# Patient Record
Sex: MALE | Race: BLACK OR AFRICAN AMERICAN | Employment: OTHER | URBAN - METROPOLITAN AREA
[De-identification: names, ages, dates, MRNs, and addresses within clinical notes are randomized per-mention and may not be internally consistent; named-entity substitution may affect disease eponyms.]

---

## 2022-03-18 PROBLEM — Z85.46 HISTORY OF PROSTATE CANCER: Status: ACTIVE | Noted: 2018-07-27

## 2022-03-18 PROBLEM — R35.0 URINARY FREQUENCY: Status: ACTIVE | Noted: 2018-08-03

## 2022-03-19 PROBLEM — N52.9 IMPOTENCE OF ORGANIC ORIGIN: Status: ACTIVE | Noted: 2018-01-30

## 2022-03-19 PROBLEM — M15.9 GENERALIZED OSTEOARTHRITIS OF MULTIPLE SITES: Status: ACTIVE | Noted: 2019-12-17

## 2022-03-19 PROBLEM — I27.82 CHRONIC PULMONARY EMBOLISM WITHOUT ACUTE COR PULMONALE (HCC): Status: ACTIVE | Noted: 2021-04-15

## 2022-03-19 PROBLEM — Z77.090 ASBESTOS EXPOSURE: Status: ACTIVE | Noted: 2019-07-30

## 2022-03-19 PROBLEM — R55 SYNCOPE: Status: ACTIVE | Noted: 2020-03-03

## 2022-03-19 PROBLEM — C61 PROSTATE CA (HCC): Status: ACTIVE | Noted: 2018-11-16

## 2022-03-19 PROBLEM — M05.79 RHEUMATOID ARTHRITIS INVOLVING MULTIPLE SITES WITH POSITIVE RHEUMATOID FACTOR (HCC): Status: ACTIVE | Noted: 2018-08-03

## 2022-03-19 PROBLEM — E11.51 TYPE 2 DIABETES, CONTROLLED, WITH PERIPHERAL CIRCULATORY DISORDER (HCC): Status: ACTIVE | Noted: 2018-11-16

## 2022-03-19 PROBLEM — Z90.79 HX OF PROSTATECTOMY: Status: ACTIVE | Noted: 2018-08-03

## 2022-03-19 PROBLEM — R39.9 LOWER URINARY TRACT SYMPTOMS (LUTS): Status: ACTIVE | Noted: 2018-01-30

## 2022-03-19 PROBLEM — R05.9 COUGH: Status: ACTIVE | Noted: 2021-07-26

## 2022-03-20 PROBLEM — D84.9 IMMUNOSUPPRESSION (HCC): Status: ACTIVE | Noted: 2021-05-24

## 2023-03-28 ENCOUNTER — OFFICE VISIT (OUTPATIENT)
Age: 78
End: 2023-03-28

## 2023-03-28 VITALS
HEART RATE: 81 BPM | RESPIRATION RATE: 18 BRPM | OXYGEN SATURATION: 99 % | BODY MASS INDEX: 25.16 KG/M2 | WEIGHT: 166 LBS | HEIGHT: 68 IN

## 2023-03-28 DIAGNOSIS — N18.30 STAGE 3 CHRONIC KIDNEY DISEASE, UNSPECIFIED WHETHER STAGE 3A OR 3B CKD (HCC): ICD-10-CM

## 2023-03-28 DIAGNOSIS — M05.79 RHEUMATOID ARTHRITIS INVOLVING MULTIPLE SITES WITH POSITIVE RHEUMATOID FACTOR (HCC): ICD-10-CM

## 2023-03-28 DIAGNOSIS — E11.51 TYPE 2 DIABETES, CONTROLLED, WITH PERIPHERAL CIRCULATORY DISORDER (HCC): ICD-10-CM

## 2023-03-28 DIAGNOSIS — M17.11 PRIMARY OSTEOARTHRITIS OF RIGHT KNEE: ICD-10-CM

## 2023-03-28 DIAGNOSIS — M17.12 PRIMARY OSTEOARTHRITIS OF LEFT KNEE: Primary | ICD-10-CM

## 2023-03-28 RX ORDER — TRIAMCINOLONE ACETONIDE 40 MG/ML
80 INJECTION, SUSPENSION INTRA-ARTICULAR; INTRAMUSCULAR ONCE
Status: COMPLETED | OUTPATIENT
Start: 2023-03-28 | End: 2023-03-28

## 2023-03-28 RX ADMIN — TRIAMCINOLONE ACETONIDE 80 MG: 40 INJECTION, SUSPENSION INTRA-ARTICULAR; INTRAMUSCULAR at 15:12

## 2023-03-28 NOTE — ASSESSMENT & PLAN NOTE
25-year-old male with severe bilateral osteoarthritis secondary to gout and rheumatoid arthritis. At this time he is tolerating his right knee arthritis fairly well but he has a large effusion of the left knee and significant pain there. He also has significant medical comorbidities including chronic kidney disease, history of pulmonary embolism, diabetes mellitus and cor pulmonale. I offered an aspiration injection of the left knee today and like to go forward with that. We touched upon total knee arthroplasty but the patient has not seen his primary care physician in some time so he must address his overall health prior to consideration for knee replacement. After obtaining written consent for left knee intra-articular injection and aspiration the patient was prepped in normal sterile fashion with freeze spray and alcohol.  50mL of cloudy synovial fluid was aspirated. 80mg kenalog and 2mL 2% lidocaine was injected . The needle was withdrawn, the area was cleansed and a sterile bandage was applied. The patient tolerated the procedure well. The fluid will not be sent as it looks consistent with gout.

## 2023-03-29 ENCOUNTER — OFFICE VISIT (OUTPATIENT)
Age: 78
End: 2023-03-29

## 2023-03-29 VITALS — TEMPERATURE: 97.1 F | HEIGHT: 68 IN | WEIGHT: 167 LBS | BODY MASS INDEX: 25.31 KG/M2

## 2023-03-29 DIAGNOSIS — M06.9 RHEUMATOID ARTHRITIS INVOLVING LEFT WRIST, UNSPECIFIED WHETHER RHEUMATOID FACTOR PRESENT (HCC): Primary | ICD-10-CM

## 2023-03-29 NOTE — PROGRESS NOTES
Garold Fleischer is a 69 y.o. male right handed retiree. Worker's Compensation and legal considerations: none    Chief Complaint   Patient presents with    Wrist Pain     Left wrist pain     Pain Score:   5    HPI: Patient presents today with complaints of left wrist pain. He has a known history of rheumatoid arthritis. He reports the wrist pain has recently improved but is occasionally painful. He also has a history of gout. Date of onset: Chronic  Injury: No  Prior Treatment:  Yes: Comment: Currently on methotrexate and has been on colchicine in the past    ROS: Review of Systems - General ROS: negative except HPI    Past Medical History:   Diagnosis Date    Diabetes mellitus ([facility] [address]     Enlarged thyroid     GERD (gastroesophageal reflux disease)     Gout     Hyperlipidemia     Kidney disease     Prostate cancer ([facility] [address]     RA (rheumatoid arthritis) ([facility] [address]     Thyroid nodule        Past Surgical History:   Procedure Laterality Date    CARDIAC CATHETERIZATION      COLONOSCOPY      EGD DELIVER THERMAL ENERGY SPHNCTR/CARDIA GERD      PROSTATECTOMY          Current Outpatient Medications   Medication Sig Dispense Refill    Alprostadil, Vasodilator, 40 MCG SOLR Please dispense compounded Alprostadil 40 mcg per ml. Patient to inject 0.5 ml as directed. May titrate to 0.6 or 0.7 if needed.       colchicine (COLCRYS) 0.6 MG tablet ceived the following from [facility]: Outside name: COLCRYS 0.6 mg tablet      febuxostat (ULORIC) 40 MG TABS tablet ceived the following from [facility]: Outside name: ULORIC 40 mg tab tablet      ferrous sulfate (IRON 325) 325 (65 Fe) MG tablet Take 325 mg by mouth daily      folic acid (FOLVITE) 1 MG tablet Take 1 mg by mouth daily      hydrOXYzine HCl (ATARAX) 10 MG tablet TAKE ONE TABLET BY MOUTH TWICE A DAY AS NEEDED FOR ANXIETY      linaCLOtide (LINZESS) 72 MCG CAPS capsule TAKE 1 CAPSULE ORALLY DAILY 30 MINUTES BEFORE BREAKFAST      methotrexate

## 2023-05-10 ENCOUNTER — OFFICE VISIT (OUTPATIENT)
Age: 78
End: 2023-05-10

## 2023-05-10 VITALS — WEIGHT: 168 LBS | HEIGHT: 68 IN | BODY MASS INDEX: 25.46 KG/M2

## 2023-05-10 DIAGNOSIS — M06.9 RHEUMATOID ARTHRITIS INVOLVING LEFT WRIST, UNSPECIFIED WHETHER RHEUMATOID FACTOR PRESENT (HCC): Primary | ICD-10-CM

## 2023-05-10 DIAGNOSIS — M25.571 RIGHT ANKLE PAIN, UNSPECIFIED CHRONICITY: ICD-10-CM

## 2023-05-10 DIAGNOSIS — M54.2 NECK PAIN: ICD-10-CM

## 2023-05-10 NOTE — PROGRESS NOTES
Juve Knowles is a 69 y.o. male right handed retiree. Worker's Compensation and legal considerations: none    Chief Complaint   Patient presents with    Wrist Pain     Left wrist pain     Pain Score:   8    HPI: Patient presents today for follow-up of left wrist pain. He is requesting an injection today. He also presents with new problems of neck pain as well as right ankle pain. He has not seen anyone for these issues. He reports them to be within the past several months. He reports having a rheumatologist.    Initial HPI: Patient presents today with complaints of left wrist pain. He has a known history of rheumatoid arthritis. He reports the wrist pain has recently improved but is occasionally painful. He also has a history of gout. Date of onset: Chronic  Injury: No  Prior Treatment:  Yes: Comment: Currently on methotrexate and has been on colchicine in the past    ROS: Review of Systems - General ROS: negative except HPI    Past Medical History:   Diagnosis Date    Diabetes mellitus ([facility] [address]     Enlarged thyroid     GERD (gastroesophageal reflux disease)     Gout     Hyperlipidemia     Kidney disease     Prostate cancer ([facility] [address]     RA (rheumatoid arthritis) ([facility] [address]     Thyroid nodule        Past Surgical History:   Procedure Laterality Date    CARDIAC CATHETERIZATION      COLONOSCOPY      EGD DELIVER THERMAL ENERGY SPHNCTR/CARDIA GERD      PROSTATECTOMY          Current Outpatient Medications   Medication Sig Dispense Refill    Alprostadil, Vasodilator, 40 MCG SOLR Please dispense compounded Alprostadil 40 mcg per ml. Patient to inject 0.5 ml as directed. May titrate to 0.6 or 0.7 if needed.       colchicine (COLCRYS) 0.6 MG tablet ceived the following from [facility]: Outside name: COLCRYS 0.6 mg tablet      febuxostat (ULORIC) 40 MG TABS tablet ceived the following from [facility]: Outside name: ULORIC 40 mg tab tablet      ferrous sulfate (IRON 325) 325 (65 Fe) MG tablet

## 2023-12-12 ENCOUNTER — OFFICE VISIT (OUTPATIENT)
Age: 78
End: 2023-12-12
Payer: MEDICARE

## 2023-12-12 VITALS
TEMPERATURE: 97.3 F | HEART RATE: 76 BPM | RESPIRATION RATE: 18 BRPM | WEIGHT: 175.2 LBS | HEIGHT: 68 IN | OXYGEN SATURATION: 97 % | BODY MASS INDEX: 26.55 KG/M2

## 2023-12-12 DIAGNOSIS — M79.18 CERVICAL MYOFASCIAL PAIN SYNDROME: ICD-10-CM

## 2023-12-12 DIAGNOSIS — M54.2 CERVICAL PAIN: ICD-10-CM

## 2023-12-12 DIAGNOSIS — M25.511 BILATERAL SHOULDER PAIN, UNSPECIFIED CHRONICITY: ICD-10-CM

## 2023-12-12 DIAGNOSIS — M25.50 MULTIPLE JOINT PAIN: ICD-10-CM

## 2023-12-12 DIAGNOSIS — M54.9 UPPER BACK PAIN: ICD-10-CM

## 2023-12-12 DIAGNOSIS — M47.812 FACET ARTHROPATHY, CERVICAL: Primary | ICD-10-CM

## 2023-12-12 DIAGNOSIS — M67.912 DISORDER OF ROTATOR CUFF OF BOTH SHOULDERS: ICD-10-CM

## 2023-12-12 DIAGNOSIS — M25.512 BILATERAL SHOULDER PAIN, UNSPECIFIED CHRONICITY: ICD-10-CM

## 2023-12-12 DIAGNOSIS — M67.911 DISORDER OF ROTATOR CUFF OF BOTH SHOULDERS: ICD-10-CM

## 2023-12-12 PROBLEM — R91.1 SOLITARY PULMONARY NODULE: Status: ACTIVE | Noted: 2023-12-12

## 2023-12-12 PROBLEM — J41.1 MUCOPURULENT CHRONIC BRONCHITIS (HCC): Status: ACTIVE | Noted: 2023-07-20

## 2023-12-12 PROBLEM — D53.1 MEGALOBLASTIC ANEMIA DUE TO VITAMIN B12 DEFICIENCY: Status: ACTIVE | Noted: 2023-12-12

## 2023-12-12 PROBLEM — M06.9 RHEUMATOID ARTHRITIS (HCC): Status: ACTIVE | Noted: 2023-12-12

## 2023-12-12 PROBLEM — J61 ASBESTOSIS (HCC): Status: ACTIVE | Noted: 2023-12-12

## 2023-12-12 PROBLEM — N18.31 STAGE 3A CHRONIC KIDNEY DISEASE (HCC): Status: ACTIVE | Noted: 2022-06-21

## 2023-12-12 PROBLEM — H15.129 NODULAR EPISCLERITIS: Status: ACTIVE | Noted: 2023-12-12

## 2023-12-12 PROBLEM — Q61.9 CONGENITAL CYSTIC KIDNEY DISEASE: Status: ACTIVE | Noted: 2023-12-12

## 2023-12-12 PROBLEM — E04.1 THYROID NODULE: Status: ACTIVE | Noted: 2023-12-12

## 2023-12-12 PROBLEM — G25.0 ESSENTIAL TREMOR: Status: ACTIVE | Noted: 2023-12-12

## 2023-12-12 PROBLEM — C61 MALIGNANT TUMOR OF PROSTATE (HCC): Status: ACTIVE | Noted: 2023-12-12

## 2023-12-12 PROBLEM — N50.82 SCROTAL PAIN: Status: ACTIVE | Noted: 2023-12-12

## 2023-12-12 PROBLEM — I48.91 UNSPECIFIED ATRIAL FIBRILLATION (HCC): Status: ACTIVE | Noted: 2023-12-12

## 2023-12-12 PROBLEM — M17.12 UNILATERAL PRIMARY OSTEOARTHRITIS, LEFT KNEE: Status: ACTIVE | Noted: 2023-12-12

## 2023-12-12 PROBLEM — C61 CARCINOMA OF PROSTATE (HCC): Status: ACTIVE | Noted: 2023-12-12

## 2023-12-12 PROBLEM — E11.9 DIABETES MELLITUS WITHOUT COMPLICATION (HCC): Status: ACTIVE | Noted: 2023-12-12

## 2023-12-12 PROBLEM — G47.01 INSOMNIA DUE TO MEDICAL CONDITION: Status: ACTIVE | Noted: 2023-12-12

## 2023-12-12 PROBLEM — M06.9 RHEUMATOID ARTHRITIS, UNSPECIFIED (HCC): Status: ACTIVE | Noted: 2023-12-12

## 2023-12-12 PROBLEM — D50.9 IRON DEFICIENCY ANEMIA: Status: ACTIVE | Noted: 2023-12-12

## 2023-12-12 PROBLEM — N45.3 EPIDIDYMO-ORCHITIS WITHOUT ABSCESS: Status: ACTIVE | Noted: 2022-06-29

## 2023-12-12 PROBLEM — R01.1 CARDIAC MURMUR, UNSPECIFIED: Status: ACTIVE | Noted: 2023-12-12

## 2023-12-12 PROBLEM — Z77.29 CONTACT WITH AND (SUSPECTED) EXPOSURE TO OTHER HAZARDOUS SUBSTANCES: Status: ACTIVE | Noted: 2023-12-12

## 2023-12-12 PROBLEM — I10 HYPERTENSION: Status: ACTIVE | Noted: 2023-12-12

## 2023-12-12 PROBLEM — E11.9 DIABETES MELLITUS, TYPE 2 (HCC): Status: ACTIVE | Noted: 2023-12-12

## 2023-12-12 PROBLEM — I47.10 SUPRAVENTRICULAR TACHYCARDIA: Status: ACTIVE | Noted: 2023-06-23

## 2023-12-12 PROBLEM — R00.2 PALPITATIONS: Status: ACTIVE | Noted: 2023-12-12

## 2023-12-12 PROBLEM — F43.12 POST-TRAUMATIC STRESS DISORDER, CHRONIC: Status: ACTIVE | Noted: 2023-12-12

## 2023-12-12 PROBLEM — I48.91 ATRIAL FIBRILLATION (HCC): Status: ACTIVE | Noted: 2023-12-12

## 2023-12-12 PROBLEM — L82.1 OTHER SEBORRHEIC KERATOSIS: Status: ACTIVE | Noted: 2023-12-12

## 2023-12-12 PROBLEM — F43.12 CHRONIC POST-TRAUMATIC STRESS DISORDER: Status: ACTIVE | Noted: 2023-12-12

## 2023-12-12 PROBLEM — M10.9 GOUT: Status: ACTIVE | Noted: 2023-12-12

## 2023-12-12 PROBLEM — N28.1 ACQUIRED COMPLEX RENAL CYST: Status: ACTIVE | Noted: 2023-12-12

## 2023-12-12 PROBLEM — F33.9 DEPRESSION, RECURRENT (HCC): Status: ACTIVE | Noted: 2022-02-21

## 2023-12-12 PROBLEM — K31.3 PYLORIC SPASM: Status: ACTIVE | Noted: 2017-09-01

## 2023-12-12 PROBLEM — D72.819 LEUKOPENIA: Status: ACTIVE | Noted: 2023-12-12

## 2023-12-12 PROBLEM — M12.9 ARTHROPATHY: Status: ACTIVE | Noted: 2023-12-12

## 2023-12-12 PROBLEM — J43.2 CENTRILOBULAR EMPHYSEMA (HCC): Status: ACTIVE | Noted: 2022-02-21

## 2023-12-12 PROCEDURE — G8417 CALC BMI ABV UP PARAM F/U: HCPCS | Performed by: PHYSICAL MEDICINE & REHABILITATION

## 2023-12-12 PROCEDURE — 1123F ACP DISCUSS/DSCN MKR DOCD: CPT | Performed by: PHYSICAL MEDICINE & REHABILITATION

## 2023-12-12 PROCEDURE — G8427 DOCREV CUR MEDS BY ELIG CLIN: HCPCS | Performed by: PHYSICAL MEDICINE & REHABILITATION

## 2023-12-12 PROCEDURE — 99204 OFFICE O/P NEW MOD 45 MIN: CPT | Performed by: PHYSICAL MEDICINE & REHABILITATION

## 2023-12-12 PROCEDURE — 1036F TOBACCO NON-USER: CPT | Performed by: PHYSICAL MEDICINE & REHABILITATION

## 2023-12-12 PROCEDURE — G8484 FLU IMMUNIZE NO ADMIN: HCPCS | Performed by: PHYSICAL MEDICINE & REHABILITATION

## 2023-12-12 ASSESSMENT — ENCOUNTER SYMPTOMS
BACK PAIN: 1
NAUSEA: 0
DIARRHEA: 0
TROUBLE SWALLOWING: 0
SHORTNESS OF BREATH: 0
VOMITING: 0

## 2023-12-12 NOTE — PROGRESS NOTES
release capsule as needed      pantoprazole (PROTONIX) 20 MG tablet ceived the following from [facility]: Outside name: pantoprazole (PROTONIX) 20 mg tablet      predniSONE (DELTASONE) 5 [facility] the [facility]: predniSONE (DELTASONE) 5 mg tablet      traZODone (DESYREL) 50 MG tablet ceived the following from  [facility]: Outside name: traZODone (DESYREL) 50 mg tablet      colchicine (COLCRYS) 0.6 MG tablet ceived the following from [facility]: Outside name: COLCRYS 0.6 mg tablet      febuxostat (ULORIC) 40 MG TABS tablet ceived the following from [facility]: Outside name: ULORIC 40 mg tab tablet      hydrOXYzine HCl (ATARAX) 10 MG tablet TAKE ONE TABLET BY MOUTH TWICE A DAY AS NEEDED FOR ANXIETY (Patient not taking: Reported on 2023)      linaCLOtide (LINZESS) 72 MCG CAPS capsule TAKE 1 CAPSULE ORALLY DAILY 30 MINUTES BEFORE BREAKFAST (Patient not taking: Reported on 2023)      mirtazapine (REMERON) 30 MG tablet TAKE ONE TABLET BY MOUTH HS      oxybutynin (DITROPAN-XL) 10 MG extended release tablet Take 1 tablet by mouth daily       No current facility-administered medications for this visit.        ALLERGIES    Allergies   Allergen Reactions    Doxycycline Hives          SOCIAL HISTORY    Social History     Socioeconomic History    Marital status: Single     Spouse name: None    Number of children: None    Years of education: None    Highest education level: None   Tobacco Use    Smoking status: Former     Packs/day: 1     Types: Cigarettes     Quit date: 1990     Years since quittin.9    Smokeless tobacco: Never   Vaping Use    Vaping Use: Never used   Substance and Sexual Activity    Alcohol use: No    Drug use: No       FAMILY HISTORY    Family History   Problem Relation Age of Onset    Diabetes Father     Cancer Mother         colon    No Known Problems Maternal Aunt     No

## 2024-01-26 NOTE — PROGRESS NOTES
[facility]  [facility] [facility] [address] [facility], [address]  [place] [address]  Phone: [phone]  Fax: [phone]      Ehsan Neumann  : 1945  PCP: Mikki Adams MD  2024    PROGRESS NOTE    HISTORY OF PRESENT ILLNESS    24  c/o neck pain radiating up to his head and to both shoulders (L>>R) x ~6 months. He has difficulty raising his arms above his head due to pain and shoulder stiffness. He also c/o llamas.   Saw Dr. Vazquez on 5/10/23 for left wrist pain, and referred to us for further eval of neck pain. He also c/o stiffness of both hands. Sees Rheumatology Dr. Landry for management of his RA, and pt takes Methotrexate.   Prednisone provided significant benefit to his current sxs.   Also notes episode of sharp pain in the big toe of his left foot at night, where it felt like something had bit him; however, when he inspected his foot, he saw no visual bite.   PLAN: advised pt to start physician-guided HEP    Ehsan Neumann is a 78 y.o. male was seen today for follow up. He continues with neck, upper back and bilateral shoulder pain. He also notes some right lumbar pain, as well as intermittent pain radiating along the sides of his abdomen and chest. Denies pain upon flexion or extension. He maintains HEP with some benefit. Pt notes that he tends to shuffle when he walks due to back pain. He continues to see Rheumatology Dr. Landry for pt's RA; his CRP level was 0.9 and ESR level was 60 on 23. Pt notes that he does not tend to eat a lot, but still feels that he is gaining weight. Pt inquired about further eval of back pain at this time. He also wondered about having a complete physical exam done by his PCP.     Pain Score:   7/10       PmHx: RA     reviewed.    REVIEW OF SYSTEMS  Review of Systems   Constitutional:  Negative for fever and unexpected weight change.   HENT:  Negative for trouble swallowing.    Eyes:  Negative for visual disturbance.

## 2024-02-13 ENCOUNTER — OFFICE VISIT (OUTPATIENT)
Age: 79
End: 2024-02-13
Payer: MEDICARE

## 2024-02-13 VITALS
WEIGHT: 178 LBS | BODY MASS INDEX: 26.98 KG/M2 | HEART RATE: 74 BPM | DIASTOLIC BLOOD PRESSURE: 76 MMHG | HEIGHT: 68 IN | RESPIRATION RATE: 18 BRPM | SYSTOLIC BLOOD PRESSURE: 138 MMHG

## 2024-02-13 DIAGNOSIS — M54.2 CERVICAL PAIN: ICD-10-CM

## 2024-02-13 DIAGNOSIS — M67.912 DISORDER OF ROTATOR CUFF OF BOTH SHOULDERS: ICD-10-CM

## 2024-02-13 DIAGNOSIS — M79.18 MYOFASCIAL PAIN: Primary | ICD-10-CM

## 2024-02-13 DIAGNOSIS — M25.50 MULTIPLE JOINT PAIN: ICD-10-CM

## 2024-02-13 DIAGNOSIS — M79.18 MUSCULOSKELETAL PAIN: ICD-10-CM

## 2024-02-13 DIAGNOSIS — M54.50 LUMBAR PAIN: ICD-10-CM

## 2024-02-13 DIAGNOSIS — M67.911 DISORDER OF ROTATOR CUFF OF BOTH SHOULDERS: ICD-10-CM

## 2024-02-13 PROCEDURE — 3075F SYST BP GE 130 - 139MM HG: CPT | Performed by: PHYSICAL MEDICINE & REHABILITATION

## 2024-02-13 PROCEDURE — 1123F ACP DISCUSS/DSCN MKR DOCD: CPT | Performed by: PHYSICAL MEDICINE & REHABILITATION

## 2024-02-13 PROCEDURE — 1036F TOBACCO NON-USER: CPT | Performed by: PHYSICAL MEDICINE & REHABILITATION

## 2024-02-13 PROCEDURE — G8484 FLU IMMUNIZE NO ADMIN: HCPCS | Performed by: PHYSICAL MEDICINE & REHABILITATION

## 2024-02-13 PROCEDURE — G8417 CALC BMI ABV UP PARAM F/U: HCPCS | Performed by: PHYSICAL MEDICINE & REHABILITATION

## 2024-02-13 PROCEDURE — G8427 DOCREV CUR MEDS BY ELIG CLIN: HCPCS | Performed by: PHYSICAL MEDICINE & REHABILITATION

## 2024-02-13 PROCEDURE — 3078F DIAST BP <80 MM HG: CPT | Performed by: PHYSICAL MEDICINE & REHABILITATION

## 2024-02-13 PROCEDURE — 99213 OFFICE O/P EST LOW 20 MIN: CPT | Performed by: PHYSICAL MEDICINE & REHABILITATION

## 2024-02-13 RX ORDER — AZITHROMYCIN 500 MG/1
TABLET, FILM COATED ORAL
COMMUNITY
Start: 2024-01-10

## 2024-03-11 ENCOUNTER — HOSPITAL ENCOUNTER (OUTPATIENT)
Facility: HOSPITAL | Age: 79
Discharge: HOME | End: 2024-03-14
Attending: PHYSICAL MEDICINE & REHABILITATION
Payer: MEDICARE

## 2024-03-11 DIAGNOSIS — M79.18 MYOFASCIAL PAIN: ICD-10-CM

## 2024-03-11 DIAGNOSIS — M79.18 MUSCULOSKELETAL PAIN: ICD-10-CM

## 2024-03-11 DIAGNOSIS — M25.50 MULTIPLE JOINT PAIN: ICD-10-CM

## 2024-03-11 DIAGNOSIS — M54.50 LUMBAR PAIN: ICD-10-CM

## 2024-03-11 PROCEDURE — 72148 MRI LUMBAR SPINE W/O DYE: CPT
